# Patient Record
Sex: FEMALE | Race: WHITE | Employment: FULL TIME | URBAN - METROPOLITAN AREA
[De-identification: names, ages, dates, MRNs, and addresses within clinical notes are randomized per-mention and may not be internally consistent; named-entity substitution may affect disease eponyms.]

---

## 2017-03-27 ENCOUNTER — TELEPHONE (OUTPATIENT)
Dept: PEDIATRICS | Facility: CLINIC | Age: 32
End: 2017-03-27

## 2017-03-27 NOTE — TELEPHONE ENCOUNTER
Panel Management Review      Patient has the following on her problem list: None      Composite cancer screening  Chart review shows that this patient is due/due soon for the following Pap Smear  Summary:    Patient is due/failing the following:   Annual Px and pap    Action needed:   Patient needs office visit for Px and Pap.    Type of outreach:    Phone, left message for patient to call back.     Questions for provider review:    None                                                                                                                                    Marian NTAION, ELISE,AAMA       Chart routed to Care Team .

## 2017-03-27 NOTE — LETTER
June 26, 2017      Keeley Adorno  [id number] [address]        Dear Keeley,       We care about your health and have reviewed your health plan including your medical conditions, medications, and lab results.  Based on this review, it is recommended that you follow up regarding the following health topic(s):  -Cervical Cancer Screening    We recommend you take the following action(s):  -schedule a PAP SMEAR EXAM which is due.  Please disregard this reminder if you have had this exam elsewhere within the last year.  It would be helpful for us to have a copy of your recent pap smear report to update your records.     Please call us at the [facility] - [phone] (or use [facility]) to address the above recommendations.     Thank you for trusting [facility] and we appreciate the opportunity to serve you.  We look forward to supporting your healthcare needs in the future.    Healthy Regards,    Your Health Care Team  [facility]

## 2017-05-24 NOTE — TELEPHONE ENCOUNTER
Type of outreach:  Phone, left message for patient to call back.   Health Maintenance Due   Topic Date Due     PAP SCREENING Q3 YR (SYSTEM ASSIGNED)  12/01/2007     Yaa Torrez CMA ([facility])

## 2017-06-03 ENCOUNTER — HEALTH MAINTENANCE LETTER (OUTPATIENT)
Age: 32
End: 2017-06-03

## 2017-06-26 NOTE — TELEPHONE ENCOUNTER
Type of outreach:  Sent letter.  Health Maintenance Due   Topic Date Due     PAP SCREENING Q3 YR (SYSTEM ASSIGNED)  12/01/2007     Yaa Torrez CMA ([facility])

## 2017-09-25 ENCOUNTER — TELEPHONE (OUTPATIENT)
Dept: PEDIATRICS | Facility: CLINIC | Age: 32
End: 2017-09-25

## 2017-09-25 NOTE — TELEPHONE ENCOUNTER
Panel Management Review      Patient has the following on her problem list: None      Composite cancer screening  Chart review shows that this patient is due/due soon for the following Pap Smear  Summary:    Patient is due/failing the following:   PHYSICAL    Action needed:   Patient needs office visit for pap, Px and fasten labs.    Type of outreach:    Phone, left message for patient to call back.     Questions for provider review:    None                                                                                                                                    Marian NATION, ELISE,AAMA       Chart routed to Care Team .

## 2018-02-06 DIAGNOSIS — Z34.80 ENCOUNTER FOR SUPERVISION OF OTHER NORMAL PREGNANCY: Primary | ICD-10-CM

## 2018-02-13 ENCOUNTER — TRANSFERRED RECORDS (OUTPATIENT)
Dept: HEALTH INFORMATION MANAGEMENT | Facility: CLINIC | Age: 33
End: 2018-02-13

## 2018-02-13 LAB — PAP-ABSTRACT: NORMAL

## 2020-06-22 ENCOUNTER — OFFICE VISIT (OUTPATIENT)
Dept: INTERNAL MEDICINE | Facility: CLINIC | Age: 35
End: 2020-06-22
Payer: COMMERCIAL

## 2020-06-22 ENCOUNTER — ANCILLARY PROCEDURE (OUTPATIENT)
Dept: GENERAL RADIOLOGY | Facility: CLINIC | Age: 35
End: 2020-06-22
Attending: NURSE PRACTITIONER
Payer: COMMERCIAL

## 2020-06-22 VITALS
HEART RATE: 119 BPM | TEMPERATURE: 98.5 F | HEIGHT: 66 IN | DIASTOLIC BLOOD PRESSURE: 90 MMHG | BODY MASS INDEX: 45.34 KG/M2 | SYSTOLIC BLOOD PRESSURE: 140 MMHG | WEIGHT: 282.1 LBS | OXYGEN SATURATION: 95 % | RESPIRATION RATE: 18 BRPM

## 2020-06-22 DIAGNOSIS — G89.29 CHRONIC MIDLINE LOW BACK PAIN WITH RIGHT-SIDED SCIATICA: ICD-10-CM

## 2020-06-22 DIAGNOSIS — R42 VERTIGO: ICD-10-CM

## 2020-06-22 DIAGNOSIS — G89.29 CHRONIC MIDLINE LOW BACK PAIN WITH RIGHT-SIDED SCIATICA: Primary | ICD-10-CM

## 2020-06-22 DIAGNOSIS — M54.41 CHRONIC MIDLINE LOW BACK PAIN WITH RIGHT-SIDED SCIATICA: ICD-10-CM

## 2020-06-22 DIAGNOSIS — E66.01 MORBID OBESITY (H): ICD-10-CM

## 2020-06-22 DIAGNOSIS — M54.41 CHRONIC MIDLINE LOW BACK PAIN WITH RIGHT-SIDED SCIATICA: Primary | ICD-10-CM

## 2020-06-22 LAB
BASOPHILS # BLD AUTO: 0 10E9/L (ref 0–0.2)
BASOPHILS NFR BLD AUTO: 0.5 %
DIFFERENTIAL METHOD BLD: NORMAL
EOSINOPHIL # BLD AUTO: 0.7 10E9/L (ref 0–0.7)
EOSINOPHIL NFR BLD AUTO: 9.2 %
ERYTHROCYTE [DISTWIDTH] IN BLOOD BY AUTOMATED COUNT: 12.4 % (ref 10–15)
HCT VFR BLD AUTO: 41.4 % (ref 35–47)
HGB BLD-MCNC: 13.7 G/DL (ref 11.7–15.7)
LYMPHOCYTES # BLD AUTO: 1.8 10E9/L (ref 0.8–5.3)
LYMPHOCYTES NFR BLD AUTO: 23.1 %
MCH RBC QN AUTO: 30.4 PG (ref 26.5–33)
MCHC RBC AUTO-ENTMCNC: 33.1 G/DL (ref 31.5–36.5)
MCV RBC AUTO: 92 FL (ref 78–100)
MONOCYTES # BLD AUTO: 0.6 10E9/L (ref 0–1.3)
MONOCYTES NFR BLD AUTO: 8 %
NEUTROPHILS # BLD AUTO: 4.7 10E9/L (ref 1.6–8.3)
NEUTROPHILS NFR BLD AUTO: 59.2 %
PLATELET # BLD AUTO: 260 10E9/L (ref 150–450)
RBC # BLD AUTO: 4.51 10E12/L (ref 3.8–5.2)
WBC # BLD AUTO: 8 10E9/L (ref 4–11)

## 2020-06-22 PROCEDURE — 36415 COLL VENOUS BLD VENIPUNCTURE: CPT | Performed by: NURSE PRACTITIONER

## 2020-06-22 PROCEDURE — 99204 OFFICE O/P NEW MOD 45 MIN: CPT | Performed by: NURSE PRACTITIONER

## 2020-06-22 PROCEDURE — 72100 X-RAY EXAM L-S SPINE 2/3 VWS: CPT

## 2020-06-22 PROCEDURE — 80050 GENERAL HEALTH PANEL: CPT | Performed by: NURSE PRACTITIONER

## 2020-06-22 RX ORDER — NAPROXEN 500 MG/1
500 TABLET ORAL 2 TIMES DAILY WITH MEALS
Qty: 60 TABLET | Refills: 1 | Status: SHIPPED | OUTPATIENT
Start: 2020-06-22 | End: 2020-07-22

## 2020-06-22 RX ORDER — MECLIZINE HYDROCHLORIDE 25 MG/1
25 TABLET ORAL 3 TIMES DAILY PRN
Qty: 30 TABLET | Refills: 0 | Status: SHIPPED | OUTPATIENT
Start: 2020-06-22 | End: 2020-07-02

## 2020-06-22 RX ORDER — METHOCARBAMOL 500 MG/1
750 TABLET, FILM COATED ORAL
Qty: 30 TABLET | Refills: 0 | Status: SHIPPED | OUTPATIENT
Start: 2020-06-22 | End: 2020-06-23

## 2020-06-22 ASSESSMENT — MIFFLIN-ST. JEOR: SCORE: 1991.35

## 2020-06-22 NOTE — PATIENT INSTRUCTIONS
Lumbar back x-ray    Go to [address] for labs    Will start anti inflammatory medication called Naproxen twice daily x 1 week then as needed with food    Will start muscle relaxant called Methocarbomal 750 mg take 1 at bedtime x 1 week then as needed for muscle pain and sleep    Continue with warm moist compresses or heating pad with moisture as often as you can and then apply topical analgesic    For dizziness, will treat with medication Meclizine 25 mg take every 8 hours as needed room spinning + take over the Zyrtec daily x 1 week then as needed    Get a BP cuff and monitor BP daily x 2 weeks then as needed with goal < 140/90 consistently

## 2020-06-22 NOTE — PROGRESS NOTES
"Subjective     Keeley Adorno is a 34 year old female who presents to clinic today for the following health issues:  Patient complains og back pain, dizziness, pain in right leg and numbness in hands.    HPI     New patient to the clinic with multiple complaints and crying:    (1) Reports low back pain now for 3 months; unaware of any injuries.  Tried icy hot or other creams, OTC aleve.  Was given Flexeril by outside provider but only took once and states \"did not help\".  Reports numbness down to calf especially when I walk.    (2) Dizziness when I lay on my back x 2 days.  (3) My right arm getting tingly or numbness x 1 week; and (4) I am not sleeping so I hurt all over.  No recent labs.  States gained some 40-50 pounds after having a baby 2 years ago and never lost the weight.    No fever or cough.  No shortness of breathe or chest pain.  No stomach issues.  Just pain in right buttocks and down leg. No longer breast feeding.    Patient Active Problem List   Diagnosis     Labor and delivery complications     Morbid obesity (H)     Past Surgical History:   Procedure Laterality Date     eye surgery as baby[  1985     FOOT SURGERY  2005    Right plates with screws     ORTHOPEDIC SURGERY       right ankle surgery[  2005       Social History     Tobacco Use     Smoking status: Current Every Day Smoker     Packs/day: 25.00     Years: 2.00     Pack years: 50.00     Types: Cigarettes     Smokeless tobacco: Never Used   Substance Use Topics     Alcohol use: No     Family History   Problem Relation Age of Onset     Hypertension Maternal Grandmother      Hypertension Maternal Grandfather         diabetes     Hypertension Mother         depression     Respiratory Maternal Uncle         Ingmar asthma     Hypertension Maternal Uncle         Brennan depression,etoh     Hypertension Maternal Aunt      C.A.D. No family hx of      Cerebrovascular Disease No family hx of      Breast Cancer No family hx of      Cancer - colorectal No " "family hx of      Prostate Cancer No family hx of          Current Outpatient Medications   Medication Sig Dispense Refill     meclizine (ANTIVERT) 25 MG tablet Take 1 tablet (25 mg) by mouth 3 times daily as needed for dizziness 30 tablet 0     methocarbamol (ROBAXIN) 500 MG tablet Take 1.5 tablets (750 mg) by mouth nightly as needed for muscle spasms 30 tablet 0     naproxen (NAPROSYN) 500 MG tablet Take 1 tablet (500 mg) by mouth 2 times daily (with meals) 60 tablet 1     Allergies   Allergen Reactions     No Known Drug Allergies        Reviewed and updated as needed this visit by Provider  Tobacco  Allergies  Meds  Med Hx  Surg Hx  Fam Hx  Soc Hx        Review of Systems   Constitutional, HEENT, cardiovascular, pulmonary, GI, , musculoskeletal, neuro, skin, endocrine and psych systems are negative, except as otherwise noted.      Objective    BP (!) 140/90   Pulse 119   Temp 98.5  F (36.9  C) (Oral)   Resp 18   Ht 1.676 m (5' 6\")   Wt 128 kg (282 lb 1.6 oz)   SpO2 95%   BMI 45.53 kg/m    Body mass index is 45.53 kg/m .     Physical Exam   GENERAL: alert and no distress  EYES: Eyes grossly normal to inspection, PERRL and conjunctivae and sclerae normal  HENT: ear canals and TM's normal  NECK: no adenopathy, no asymmetry, masses, or scars and thyroid normal to palpation  RESP: lungs clear to auscultation - no rales, rhonchi or wheezes  CV: regular rate and rhythm, normal S1 S2, no S3 or S4, no murmur, click or rub, no peripheral edema and peripheral pulses strong  ABDOMEN: soft, nontender, no hepatosplenomegaly, no masses and bowel sounds normal  BACK:  Guarded with up and down movement.  No point tenderness along vertebral column but has right SI joint pain with palpitation and pressure.  Good strength in legs and arms.  MS: no gross musculoskeletal defects noted, no edema  SKIN: no suspicious lesions or rashes  NEURO: Normal strength and tone, mentation intact and speech normal  PSYCH: mentation " "appears normal, affect crying at first but then after listening to all her complaints she was ok and seemed more cheerful; no plan to harm self or others    none         Assessment & Plan     1. Chronic midline low back pain with right-sided sciatica  - ordered x-ray:  - XR Lumbar Spine 2/3 Views; Future  - Ordered medications with instructions how to take properly    naproxen (NAPROSYN) 500 MG tablet; Take 1 tablet (500 mg) by mouth 2 times daily (with meals)  Dispense: 60 tablet; Refill: 1    methocarbamol (ROBAXIN) 500 MG tablet; Take 1.5 tablets (750 mg) by mouth nightly as needed for muscle spasms  Dispense: 30 tablet; Refill: 0  - discussed stretching exercises  - warm bathes or compresses to area may help along with OTC topical analgesics    2. Morbid obesity (H)  - weight gain of 40-50 pounds so ordered lab:  - TSH with free T4 reflex    3. Vertigo  - Ordered labs:    Comprehensive metabolic panel    CBC with platelets differential  - Given medication to help with room spinning as directed:    meclizine (ANTIVERT) 25 MG tablet; Take 1 tablet (25 mg) by mouth 3 times daily as needed for dizziness  Dispense: 30 tablet; Refill: 0  - suggested OTC Zyrtec may help as well for a week to dry out ears and sinuses     Tobacco Cessation:   reports that she has been smoking cigarettes. She has a 50.00 pack-year smoking history. She has never used smokeless tobacco.        BMI:   Estimated body mass index is 45.53 kg/m  as calculated from the following:    Height as of this encounter: 1.676 m (5' 6\").    Weight as of this encounter: 128 kg (282 lb 1.6 oz).     Spent over 50 % of this 45 minute visit reviewing patients complaints, history, and counseling          Patient Instructions   Lumbar back x-ray    Go to [address] for labs    Will start anti inflammatory medication called Naproxen twice daily x 1 week then as needed with food    Will start muscle relaxant called Methocarbomal 750 mg take 1 at bedtime x 1 week " then as needed for muscle pain and sleep    Continue with warm moist compresses or heating pad with moisture as often as you can and then apply topical analgesic    For dizziness, will treat with medication Meclizine 25 mg take every 8 hours as needed room spinning + take over the Zyrtec daily x 1 week then as needed    Get a BP cuff and monitor BP daily x 2 weeks then as needed with goal < 140/90 consistently      Return in about 3 weeks (around 7/13/2020) for Video Visit.    Suzanne Lozano, CHANA  [facility]

## 2020-06-23 ENCOUNTER — TELEPHONE (OUTPATIENT)
Dept: INTERNAL MEDICINE | Facility: CLINIC | Age: 35
End: 2020-06-23

## 2020-06-23 DIAGNOSIS — M54.41 CHRONIC MIDLINE LOW BACK PAIN WITH RIGHT-SIDED SCIATICA: ICD-10-CM

## 2020-06-23 DIAGNOSIS — G89.29 CHRONIC MIDLINE LOW BACK PAIN WITH RIGHT-SIDED SCIATICA: ICD-10-CM

## 2020-06-23 LAB
ALBUMIN SERPL-MCNC: 3.4 G/DL (ref 3.4–5)
ALP SERPL-CCNC: 67 U/L (ref 40–150)
ALT SERPL W P-5'-P-CCNC: 39 U/L (ref 0–50)
ANION GAP SERPL CALCULATED.3IONS-SCNC: 5 MMOL/L (ref 3–14)
AST SERPL W P-5'-P-CCNC: 23 U/L (ref 0–45)
BILIRUB SERPL-MCNC: 0.4 MG/DL (ref 0.2–1.3)
BUN SERPL-MCNC: 19 MG/DL (ref 7–30)
CALCIUM SERPL-MCNC: 9.2 MG/DL (ref 8.5–10.1)
CHLORIDE SERPL-SCNC: 107 MMOL/L (ref 94–109)
CO2 SERPL-SCNC: 25 MMOL/L (ref 20–32)
CREAT SERPL-MCNC: 0.75 MG/DL (ref 0.52–1.04)
GFR SERPL CREATININE-BSD FRML MDRD: >90 ML/MIN/{1.73_M2}
GLUCOSE SERPL-MCNC: 99 MG/DL (ref 70–99)
POTASSIUM SERPL-SCNC: 4 MMOL/L (ref 3.4–5.3)
PROT SERPL-MCNC: 7.1 G/DL (ref 6.8–8.8)
SODIUM SERPL-SCNC: 137 MMOL/L (ref 133–144)
TSH SERPL DL<=0.005 MIU/L-ACNC: 1.93 MU/L (ref 0.4–4)

## 2020-06-23 RX ORDER — METHOCARBAMOL 500 MG/1
750 TABLET, FILM COATED ORAL
Qty: 30 TABLET | Refills: 0 | Status: SHIPPED | OUTPATIENT
Start: 2020-06-23

## 2020-06-23 NOTE — TELEPHONE ENCOUNTER
Directions are not clear--see E-Rx. Says to take 1 and 1/2 at night, but in comments says to take 1 at night. Tried calling and was on hold over 15 minutes. Please clarify. Thank you.

## 2020-06-24 NOTE — TELEPHONE ENCOUNTER
Per chart, it appears patient reviewed provider's message on 6/22/20 lab results.     Left a voicemail asking patient to call the clinic back if she had further questions.

## 2020-06-24 NOTE — TELEPHONE ENCOUNTER
I reviewed the blood work earlier this morning and all results are normal.  You can notify the patient and send her a copy although she has access to [facility]>

## 2020-07-20 ENCOUNTER — TELEPHONE (OUTPATIENT)
Dept: PEDIATRICS | Facility: CLINIC | Age: 35
End: 2020-07-20

## 2020-07-20 NOTE — TELEPHONE ENCOUNTER
Panel Management Review      Patient has the following on her problem list: None      Composite cancer screening  Chart review shows that this patient is due/due soon for the following Pap Smear  Summary:    Patient is due/failing the following:   PAP    Action needed:   Per Care Everywhere patient had pap done at [facility] 1/13/18 - NIL    Type of outreach:    Sent to abstraction    Questions for provider review:    None                                                                                                                                  Liyah Baker CMA     Chart closed.

## 2021-01-10 ENCOUNTER — HEALTH MAINTENANCE LETTER (OUTPATIENT)
Age: 36
End: 2021-01-10

## 2021-10-23 ENCOUNTER — HEALTH MAINTENANCE LETTER (OUTPATIENT)
Age: 36
End: 2021-10-23

## 2022-02-12 ENCOUNTER — HEALTH MAINTENANCE LETTER (OUTPATIENT)
Age: 37
End: 2022-02-12

## 2022-10-10 ENCOUNTER — HEALTH MAINTENANCE LETTER (OUTPATIENT)
Age: 37
End: 2022-10-10

## 2023-02-18 ENCOUNTER — HEALTH MAINTENANCE LETTER (OUTPATIENT)
Age: 38
End: 2023-02-18

## 2024-03-16 ENCOUNTER — HEALTH MAINTENANCE LETTER (OUTPATIENT)
Age: 39
End: 2024-03-16